# Patient Record
Sex: MALE | Race: BLACK OR AFRICAN AMERICAN | Employment: FULL TIME | URBAN - METROPOLITAN AREA
[De-identification: names, ages, dates, MRNs, and addresses within clinical notes are randomized per-mention and may not be internally consistent; named-entity substitution may affect disease eponyms.]

---

## 2023-03-22 ENCOUNTER — APPOINTMENT (OUTPATIENT)
Dept: GENERAL RADIOLOGY | Age: 48
DRG: 872 | End: 2023-03-22
Payer: COMMERCIAL

## 2023-03-22 ENCOUNTER — HOSPITAL ENCOUNTER (INPATIENT)
Age: 48
LOS: 3 days | Discharge: HOME | DRG: 872 | End: 2023-03-25
Attending: EMERGENCY MEDICINE | Admitting: INTERNAL MEDICINE
Payer: COMMERCIAL

## 2023-03-22 ENCOUNTER — APPOINTMENT (OUTPATIENT)
Dept: CT IMAGING | Age: 48
DRG: 872 | End: 2023-03-22
Payer: COMMERCIAL

## 2023-03-22 ENCOUNTER — APPOINTMENT (OUTPATIENT)
Dept: ULTRASOUND IMAGING | Age: 48
DRG: 872 | End: 2023-03-22
Payer: COMMERCIAL

## 2023-03-22 DIAGNOSIS — R65.20 SEVERE SEPSIS (HCC): Primary | ICD-10-CM

## 2023-03-22 DIAGNOSIS — N17.9 AKI (ACUTE KIDNEY INJURY) (HCC): ICD-10-CM

## 2023-03-22 DIAGNOSIS — N30.00 ACUTE CYSTITIS WITHOUT HEMATURIA: ICD-10-CM

## 2023-03-22 DIAGNOSIS — R79.89 ELEVATED LFTS: ICD-10-CM

## 2023-03-22 DIAGNOSIS — A41.9 SEVERE SEPSIS (HCC): Primary | ICD-10-CM

## 2023-03-22 DIAGNOSIS — R74.8 ELEVATED CK: ICD-10-CM

## 2023-03-22 LAB
ALBUMIN SERPL-MCNC: 3.4 G/DL (ref 3.4–5)
ALP SERPL-CCNC: 118 U/L (ref 40–129)
ALT SERPL-CCNC: 167 U/L (ref 10–40)
ANION GAP SERPL CALCULATED.3IONS-SCNC: 17 MMOL/L (ref 3–16)
APAP SERPL-MCNC: <5 UG/ML (ref 10–30)
AST SERPL-CCNC: 140 U/L (ref 15–37)
BACTERIA URNS QL MICRO: ABNORMAL /HPF
BASE EXCESS BLDV CALC-SCNC: 1.3 MMOL/L
BASOPHILS # BLD: 0 K/UL (ref 0–0.2)
BASOPHILS NFR BLD: 0 %
BILIRUB DIRECT SERPL-MCNC: <0.2 MG/DL (ref 0–0.3)
BILIRUB INDIRECT SERPL-MCNC: ABNORMAL MG/DL (ref 0–1)
BILIRUB SERPL-MCNC: 0.9 MG/DL (ref 0–1)
BILIRUB UR QL STRIP.AUTO: ABNORMAL
BUN SERPL-MCNC: 33 MG/DL (ref 7–20)
C DIFF TOX A+B STL QL IA: NORMAL
CALCIUM SERPL-MCNC: 8.7 MG/DL (ref 8.3–10.6)
CHLORIDE SERPL-SCNC: 99 MMOL/L (ref 99–110)
CHP ED QC CHECK: YES
CK SERPL-CCNC: 1996 U/L (ref 39–308)
CLARITY UR: ABNORMAL
CO2 BLDV-SCNC: 26 MMOL/L
CO2 SERPL-SCNC: 20 MMOL/L (ref 21–32)
COHGB MFR BLDV: 1.2 %
COLOR UR: ABNORMAL
CREAT SERPL-MCNC: 1.8 MG/DL (ref 0.9–1.3)
CRP SERPL-MCNC: 388.1 MG/L (ref 0–5.1)
DEPRECATED RDW RBC AUTO: 13.6 % (ref 12.4–15.4)
EKG ATRIAL RATE: 132 BPM
EKG DIAGNOSIS: NORMAL
EKG P AXIS: 60 DEGREES
EKG P-R INTERVAL: 134 MS
EKG Q-T INTERVAL: 298 MS
EKG QRS DURATION: 78 MS
EKG QTC CALCULATION (BAZETT): 441 MS
EKG R AXIS: 75 DEGREES
EKG T AXIS: 4 DEGREES
EKG VENTRICULAR RATE: 132 BPM
EOSINOPHIL # BLD: 0 K/UL (ref 0–0.6)
EOSINOPHIL NFR BLD: 0 %
EPI CELLS #/AREA URNS AUTO: 7 /HPF (ref 0–5)
EPITHELIALS (RENAL), 013149: PRESENT
ETHANOLAMINE SERPL-MCNC: NORMAL MG/DL (ref 0–0.08)
FLUAV RNA UPPER RESP QL NAA+PROBE: NEGATIVE
FLUBV AG NPH QL: NEGATIVE
GFR SERPLBLD CREATININE-BSD FMLA CKD-EPI: 46 ML/MIN/{1.73_M2}
GLUCOSE BLD-MCNC: 150 MG/DL
GLUCOSE BLD-MCNC: 150 MG/DL (ref 70–99)
GLUCOSE SERPL-MCNC: 192 MG/DL (ref 70–99)
GLUCOSE UR STRIP.AUTO-MCNC: 100 MG/DL
GRANULAR CASTS: PRESENT
HAV IGM SERPL QL IA: NORMAL
HBV CORE IGM SERPL QL IA: NORMAL
HBV SURFACE AG SERPL QL IA: NORMAL
HCO3 BLDV-SCNC: 25 MMOL/L (ref 23–29)
HCT VFR BLD AUTO: 37.7 % (ref 40.5–52.5)
HCV AB SERPL QL IA: NORMAL
HGB BLD-MCNC: 12.6 G/DL (ref 13.5–17.5)
HGB UR QL STRIP.AUTO: ABNORMAL
HYALINE CASTS #/AREA URNS AUTO: 35 /LPF (ref 0–8)
KETONES UR STRIP.AUTO-MCNC: ABNORMAL MG/DL
LACTATE BLDV-SCNC: 2.6 MMOL/L (ref 0.4–2)
LACTATE BLDV-SCNC: 3.4 MMOL/L (ref 0.4–2)
LEUKOCYTE ESTERASE UR QL STRIP.AUTO: ABNORMAL
LIPASE SERPL-CCNC: 59 U/L (ref 13–60)
LYMPHOCYTES # BLD: 0.4 K/UL (ref 1–5.1)
LYMPHOCYTES NFR BLD: 2 %
MCH RBC QN AUTO: 27.5 PG (ref 26–34)
MCHC RBC AUTO-ENTMCNC: 33.5 G/DL (ref 31–36)
MCV RBC AUTO: 82.1 FL (ref 80–100)
METAMYELOCYTES NFR BLD MANUAL: 1 %
METHGB MFR BLDV: 0.6 %
MONOCYTES # BLD: 0.4 K/UL (ref 0–1.3)
MONOCYTES NFR BLD: 2 %
MUCUS: PRESENT
NEUTROPHILS # BLD: 19.8 K/UL (ref 1.7–7.7)
NEUTROPHILS NFR BLD: 66 %
NEUTS BAND NFR BLD MANUAL: 29 % (ref 0–7)
NEUTS VAC BLD QL SMEAR: PRESENT
NITRITE UR QL STRIP.AUTO: POSITIVE
O2 THERAPY: ABNORMAL
PCO2 BLDV: 37 MMHG (ref 40–50)
PERFORMED ON: ABNORMAL
PH BLDV: 7.44 [PH] (ref 7.35–7.45)
PH UR STRIP.AUTO: 5 [PH] (ref 5–8)
PLATELET # BLD AUTO: 205 K/UL (ref 135–450)
PLATELET BLD QL SMEAR: ADEQUATE
PMV BLD AUTO: 7.8 FL (ref 5–10.5)
PO2 BLDV: <30 MMHG
POTASSIUM SERPL-SCNC: 4.5 MMOL/L (ref 3.5–5.1)
PROCALCITONIN SERPL IA-MCNC: 99.65 NG/ML (ref 0–0.15)
PROT SERPL-MCNC: 8.4 G/DL (ref 6.4–8.2)
PROT UR STRIP.AUTO-MCNC: 300 MG/DL
RBC # BLD AUTO: 4.59 M/UL (ref 4.2–5.9)
RBC CLUMPS #/AREA URNS AUTO: 14 /HPF (ref 0–4)
S PYO AG THROAT QL: NEGATIVE
SAO2 % BLDV: 35 %
SARS-COV-2 RDRP RESP QL NAA+PROBE: NOT DETECTED
SLIDE REVIEW: ABNORMAL
SODIUM SERPL-SCNC: 136 MMOL/L (ref 136–145)
SP GR UR STRIP.AUTO: 1.03 (ref 1–1.03)
UA COMPLETE W REFLEX CULTURE PNL UR: ABNORMAL
UA DIPSTICK W REFLEX MICRO PNL UR: YES
URN SPEC COLLECT METH UR: ABNORMAL
UROBILINOGEN UR STRIP-ACNC: 1 E.U./DL
WBC # BLD AUTO: 20.6 K/UL (ref 4–11)
WBC #/AREA URNS AUTO: 7 /HPF (ref 0–5)

## 2023-03-22 PROCEDURE — 93005 ELECTROCARDIOGRAM TRACING: CPT | Performed by: EMERGENCY MEDICINE

## 2023-03-22 PROCEDURE — 2500000003 HC RX 250 WO HCPCS: Performed by: INTERNAL MEDICINE

## 2023-03-22 PROCEDURE — 80143 DRUG ASSAY ACETAMINOPHEN: CPT

## 2023-03-22 PROCEDURE — 2580000003 HC RX 258: Performed by: INTERNAL MEDICINE

## 2023-03-22 PROCEDURE — 80048 BASIC METABOLIC PNL TOTAL CA: CPT

## 2023-03-22 PROCEDURE — 96365 THER/PROPH/DIAG IV INF INIT: CPT

## 2023-03-22 PROCEDURE — 71046 X-RAY EXAM CHEST 2 VIEWS: CPT

## 2023-03-22 PROCEDURE — 87150 DNA/RNA AMPLIFIED PROBE: CPT

## 2023-03-22 PROCEDURE — 93010 ELECTROCARDIOGRAM REPORT: CPT | Performed by: INTERNAL MEDICINE

## 2023-03-22 PROCEDURE — 87077 CULTURE AEROBIC IDENTIFY: CPT

## 2023-03-22 PROCEDURE — 94761 N-INVAS EAR/PLS OXIMETRY MLT: CPT

## 2023-03-22 PROCEDURE — 99285 EMERGENCY DEPT VISIT HI MDM: CPT

## 2023-03-22 PROCEDURE — 87324 CLOSTRIDIUM AG IA: CPT

## 2023-03-22 PROCEDURE — 6360000002 HC RX W HCPCS: Performed by: INTERNAL MEDICINE

## 2023-03-22 PROCEDURE — 2580000003 HC RX 258: Performed by: EMERGENCY MEDICINE

## 2023-03-22 PROCEDURE — 84145 PROCALCITONIN (PCT): CPT

## 2023-03-22 PROCEDURE — 2060000000 HC ICU INTERMEDIATE R&B

## 2023-03-22 PROCEDURE — 6360000002 HC RX W HCPCS: Performed by: EMERGENCY MEDICINE

## 2023-03-22 PROCEDURE — 80074 ACUTE HEPATITIS PANEL: CPT

## 2023-03-22 PROCEDURE — 96361 HYDRATE IV INFUSION ADD-ON: CPT

## 2023-03-22 PROCEDURE — 82077 ASSAY SPEC XCP UR&BREATH IA: CPT

## 2023-03-22 PROCEDURE — 85025 COMPLETE CBC W/AUTO DIFF WBC: CPT

## 2023-03-22 PROCEDURE — 96375 TX/PRO/DX INJ NEW DRUG ADDON: CPT

## 2023-03-22 PROCEDURE — 83605 ASSAY OF LACTIC ACID: CPT

## 2023-03-22 PROCEDURE — 96366 THER/PROPH/DIAG IV INF ADDON: CPT

## 2023-03-22 PROCEDURE — 82550 ASSAY OF CK (CPK): CPT

## 2023-03-22 PROCEDURE — 87040 BLOOD CULTURE FOR BACTERIA: CPT

## 2023-03-22 PROCEDURE — 74176 CT ABD & PELVIS W/O CONTRAST: CPT

## 2023-03-22 PROCEDURE — 87635 SARS-COV-2 COVID-19 AMP PRB: CPT

## 2023-03-22 PROCEDURE — 76705 ECHO EXAM OF ABDOMEN: CPT

## 2023-03-22 PROCEDURE — 81001 URINALYSIS AUTO W/SCOPE: CPT

## 2023-03-22 PROCEDURE — 96368 THER/DIAG CONCURRENT INF: CPT

## 2023-03-22 PROCEDURE — 2500000003 HC RX 250 WO HCPCS: Performed by: EMERGENCY MEDICINE

## 2023-03-22 PROCEDURE — 36415 COLL VENOUS BLD VENIPUNCTURE: CPT

## 2023-03-22 PROCEDURE — 87081 CULTURE SCREEN ONLY: CPT

## 2023-03-22 PROCEDURE — 87880 STREP A ASSAY W/OPTIC: CPT

## 2023-03-22 PROCEDURE — 87804 INFLUENZA ASSAY W/OPTIC: CPT

## 2023-03-22 PROCEDURE — 2580000003 HC RX 258: Performed by: PHYSICIAN ASSISTANT

## 2023-03-22 PROCEDURE — 83690 ASSAY OF LIPASE: CPT

## 2023-03-22 PROCEDURE — 6360000002 HC RX W HCPCS: Performed by: PHYSICIAN ASSISTANT

## 2023-03-22 PROCEDURE — 87449 NOS EACH ORGANISM AG IA: CPT

## 2023-03-22 PROCEDURE — 82803 BLOOD GASES ANY COMBINATION: CPT

## 2023-03-22 PROCEDURE — 80076 HEPATIC FUNCTION PANEL: CPT

## 2023-03-22 PROCEDURE — 86140 C-REACTIVE PROTEIN: CPT

## 2023-03-22 RX ORDER — 0.9 % SODIUM CHLORIDE 0.9 %
1000 INTRAVENOUS SOLUTION INTRAVENOUS ONCE
Status: COMPLETED | OUTPATIENT
Start: 2023-03-22 | End: 2023-03-22

## 2023-03-22 RX ORDER — ACETAMINOPHEN 650 MG/1
650 SUPPOSITORY RECTAL EVERY 6 HOURS PRN
Status: DISCONTINUED | OUTPATIENT
Start: 2023-03-22 | End: 2023-03-25 | Stop reason: HOSPADM

## 2023-03-22 RX ORDER — METRONIDAZOLE 500 MG/100ML
500 INJECTION, SOLUTION INTRAVENOUS EVERY 8 HOURS
Status: DISCONTINUED | OUTPATIENT
Start: 2023-03-22 | End: 2023-03-25 | Stop reason: HOSPADM

## 2023-03-22 RX ORDER — SODIUM CHLORIDE 9 MG/ML
INJECTION, SOLUTION INTRAVENOUS PRN
Status: DISCONTINUED | OUTPATIENT
Start: 2023-03-22 | End: 2023-03-25 | Stop reason: HOSPADM

## 2023-03-22 RX ORDER — POTASSIUM CHLORIDE 20 MEQ/1
40 TABLET, EXTENDED RELEASE ORAL PRN
Status: DISCONTINUED | OUTPATIENT
Start: 2023-03-22 | End: 2023-03-25 | Stop reason: HOSPADM

## 2023-03-22 RX ORDER — ONDANSETRON 2 MG/ML
4 INJECTION INTRAMUSCULAR; INTRAVENOUS EVERY 6 HOURS PRN
Status: DISCONTINUED | OUTPATIENT
Start: 2023-03-22 | End: 2023-03-25 | Stop reason: HOSPADM

## 2023-03-22 RX ORDER — SODIUM CHLORIDE 9 MG/ML
INJECTION, SOLUTION INTRAVENOUS CONTINUOUS
Status: DISCONTINUED | OUTPATIENT
Start: 2023-03-22 | End: 2023-03-23

## 2023-03-22 RX ORDER — PROMETHAZINE HYDROCHLORIDE 25 MG/1
12.5 TABLET ORAL EVERY 6 HOURS PRN
Status: DISCONTINUED | OUTPATIENT
Start: 2023-03-22 | End: 2023-03-25 | Stop reason: HOSPADM

## 2023-03-22 RX ORDER — MAGNESIUM SULFATE IN WATER 40 MG/ML
2000 INJECTION, SOLUTION INTRAVENOUS PRN
Status: DISCONTINUED | OUTPATIENT
Start: 2023-03-22 | End: 2023-03-25 | Stop reason: HOSPADM

## 2023-03-22 RX ORDER — ACETAMINOPHEN 325 MG/1
650 TABLET ORAL EVERY 6 HOURS PRN
COMMUNITY

## 2023-03-22 RX ORDER — SODIUM CHLORIDE 0.9 % (FLUSH) 0.9 %
10 SYRINGE (ML) INJECTION PRN
Status: DISCONTINUED | OUTPATIENT
Start: 2023-03-22 | End: 2023-03-25 | Stop reason: HOSPADM

## 2023-03-22 RX ORDER — ACETAMINOPHEN 325 MG/1
650 TABLET ORAL EVERY 6 HOURS PRN
Status: DISCONTINUED | OUTPATIENT
Start: 2023-03-22 | End: 2023-03-25 | Stop reason: HOSPADM

## 2023-03-22 RX ORDER — POTASSIUM CHLORIDE 7.45 MG/ML
10 INJECTION INTRAVENOUS PRN
Status: DISCONTINUED | OUTPATIENT
Start: 2023-03-22 | End: 2023-03-22

## 2023-03-22 RX ORDER — SODIUM CHLORIDE 0.9 % (FLUSH) 0.9 %
10 SYRINGE (ML) INJECTION EVERY 12 HOURS SCHEDULED
Status: DISCONTINUED | OUTPATIENT
Start: 2023-03-22 | End: 2023-03-25 | Stop reason: HOSPADM

## 2023-03-22 RX ORDER — POTASSIUM CHLORIDE 7.45 MG/ML
10 INJECTION INTRAVENOUS PRN
Status: DISCONTINUED | OUTPATIENT
Start: 2023-03-22 | End: 2023-03-25 | Stop reason: HOSPADM

## 2023-03-22 RX ORDER — ENOXAPARIN SODIUM 100 MG/ML
40 INJECTION SUBCUTANEOUS DAILY
Status: DISCONTINUED | OUTPATIENT
Start: 2023-03-23 | End: 2023-03-25 | Stop reason: HOSPADM

## 2023-03-22 RX ORDER — ONDANSETRON 2 MG/ML
4 INJECTION INTRAMUSCULAR; INTRAVENOUS ONCE
Status: COMPLETED | OUTPATIENT
Start: 2023-03-22 | End: 2023-03-22

## 2023-03-22 RX ADMIN — CEFTRIAXONE SODIUM 2000 MG: 2 INJECTION, POWDER, FOR SOLUTION INTRAMUSCULAR; INTRAVENOUS at 14:34

## 2023-03-22 RX ADMIN — SODIUM CHLORIDE 1000 ML: 9 INJECTION, SOLUTION INTRAVENOUS at 13:36

## 2023-03-22 RX ADMIN — METRONIDAZOLE 500 MG: 500 INJECTION, SOLUTION INTRAVENOUS at 22:58

## 2023-03-22 RX ADMIN — ONDANSETRON 4 MG: 2 INJECTION INTRAMUSCULAR; INTRAVENOUS at 13:54

## 2023-03-22 RX ADMIN — METRONIDAZOLE 1000 MG: 500 INJECTION, SOLUTION INTRAVENOUS at 14:39

## 2023-03-22 RX ADMIN — SODIUM CHLORIDE 1000 ML: 9 INJECTION, SOLUTION INTRAVENOUS at 17:47

## 2023-03-22 RX ADMIN — Medication 10 ML: at 21:40

## 2023-03-22 RX ADMIN — SODIUM CHLORIDE 1000 ML: 9 INJECTION, SOLUTION INTRAVENOUS at 13:42

## 2023-03-22 RX ADMIN — SODIUM CHLORIDE: 9 INJECTION, SOLUTION INTRAVENOUS at 21:33

## 2023-03-22 RX ADMIN — CEFEPIME 2000 MG: 2 INJECTION, POWDER, FOR SOLUTION INTRAVENOUS at 21:39

## 2023-03-22 ASSESSMENT — LIFESTYLE VARIABLES
HOW OFTEN DO YOU HAVE A DRINK CONTAINING ALCOHOL: NEVER
HOW OFTEN DO YOU HAVE A DRINK CONTAINING ALCOHOL: 2-4 TIMES A MONTH
HOW OFTEN DO YOU HAVE A DRINK CONTAINING ALCOHOL: 2-4 TIMES A MONTH
HOW MANY STANDARD DRINKS CONTAINING ALCOHOL DO YOU HAVE ON A TYPICAL DAY: 1 OR 2
HOW MANY STANDARD DRINKS CONTAINING ALCOHOL DO YOU HAVE ON A TYPICAL DAY: 1 OR 2

## 2023-03-22 ASSESSMENT — PAIN - FUNCTIONAL ASSESSMENT: PAIN_FUNCTIONAL_ASSESSMENT: NONE - DENIES PAIN

## 2023-03-22 NOTE — PROGRESS NOTES
Medication Reconciliation    List of medications patient is currently taking is complete. Source of information: 1. Conversation with patient at bedside                                      2. EPIC records      Allergies  Patient has no known allergies. Notes regarding home medications:   1. Patient states that he has been taking Tylenol recently as well as a few doses of family member's methocarbamol 500mg tablets.   2. Patient reports no regular prescription/OTC/herbal medications      [facility] O[facility]   3/22/2023  4:46 PM

## 2023-03-22 NOTE — LETTER
[facility]  [address] [address]  Phone: [phone]             March 25, 2023    Patient: Humberto Bro   YOB: 1975   Date of Visit: 3/22/2023       To Whom It May Concern:    Humberto Bro was seen and treated in our facility  beginning 3/22/2023 until 3/25/2023. He may return to work on Friday March 31, 2023.       Sincerely,       Scott Harrell RN         Signature:__________________________________

## 2023-03-22 NOTE — ED PROVIDER NOTES
time range)   0.9 % sodium chloride infusion (has no administration in time range)   cefepime (MAXIPIME) 2,000 mg in sodium chloride 0.9 % 50 mL IVPB (mini-bag) (has no administration in time range)   cefepime (MAXIPIME) 2,000 mg in sodium chloride 0.9 % 50 mL IVPB (mini-bag) (has no administration in time range)   vancomycin (VANCOCIN) 1,500 mg in sodium chloride 0.9 % 250 mL IVPB (ADDAVIAL) (has no administration in time range)   vancomycin (VANCOCIN) intermittent dosing (placeholder) (has no administration in time range)   0.9 % sodium chloride bolus (0 mLs IntraVENous Stopped 3/22/23 1542)   0.9 % sodium chloride bolus (0 mLs IntraVENous Stopped 3/22/23 1541)   ondansetron (ZOFRAN) injection 4 mg (4 mg IntraVENous Given 3/22/23 1354)   cefTRIAXone (ROCEPHIN) 2,000 mg in sodium chloride 0.9 % 50 mL IVPB (mini-bag) (0 mg IntraVENous Stopped 3/22/23 1542)   metroNIDAZOLE (FLAGYL) 1,000 mg IVPB (0 mg IntraVENous Stopped 3/22/23 1658)   0.9 % sodium chloride bolus (0 mLs IntraVENous Stopped 3/22/23 1955)             Patient was nontoxic, ill appearing, with normal vital signs with exception tachycardia with a rate of 128. Saturating well on room air. Presents for nausea, vomiting, thirst for the past 3-4 days. On exam, he is very dry appearing. Lips and tongue extremely dry. Will check labs and chest x-ray. 2 L of IV fluid ordered. Differential diagnosis includes viral illness, TAMANNA, pneumonia, UTI, sepsis, other    Labs showed negative flu, COVID, strep. Has leukocytosis of 20.6. Lactic acid 3.4. Calcitonin is 99. meet sepsis criteria. Blood cultures obtained. Given Rocephin and Flagyl. Metabolic panel shows glucose 192, anion gap 17, bicarb 20. VBG shows normal venous pH. BUN 33, creatinine 1.8. Has an TAMANNA. ,  and normal alk phos. given the elevated liver enzymes right upper quadrant ultrasound was obtained and was negative. Hepatitis negative. CK 2000.   Tylenol ethanol signed)           Rylee Hernandez, [address]  03/22/23 3845

## 2023-03-22 NOTE — ED TRIAGE NOTES
Pt presents to ED from urgent care, pt states has been ill for approximately 3-4 weeks. Pt is ill appearing, dry mouth, pale, tachycardia noted. Pt alert and oriented and able to follow commands. Denies pain at this time. States urgent care tested him for strep throat, came back positive. Denies any sore throat.  Pt states vomited 4 times yesterday, no appetite, able to drink, but states pop hurts going down

## 2023-03-22 NOTE — ED PROVIDER NOTES
12 lead EKG:  Sinus Tachycardia 132  Axis is   Normal  QTc is  normal  There is no specific ST-T wave changes appreciated. There is no clear evidence of acute ischemia or infarction. It was compared against an EKG from none.        Jocelyn Altamirano MD  03/22/23 7466

## 2023-03-22 NOTE — ED NOTES
Pt. to be admitted to [facility] [address] room 5108. Report called to Dorinda Rasmussen RN and transport to be initiated.       Hazel Phillips RN  03/22/23 2743

## 2023-03-23 LAB
BASOPHILS # BLD: 0 K/UL (ref 0–0.2)
BASOPHILS NFR BLD: 0 %
CK SERPL-CCNC: 1813 U/L (ref 39–308)
CREAT SERPL-MCNC: 1.3 MG/DL (ref 0.9–1.3)
DEPRECATED RDW RBC AUTO: 13.8 % (ref 12.4–15.4)
DOHLE BOD BLD QL SMEAR: PRESENT
EOSINOPHIL # BLD: 0 K/UL (ref 0–0.6)
EOSINOPHIL NFR BLD: 0 %
GFR SERPLBLD CREATININE-BSD FMLA CKD-EPI: >60 ML/MIN/{1.73_M2}
HCT VFR BLD AUTO: 34.2 % (ref 40.5–52.5)
HGB BLD-MCNC: 11.5 G/DL (ref 13.5–17.5)
LYMPHOCYTES # BLD: 1.1 K/UL (ref 1–5.1)
LYMPHOCYTES NFR BLD: 5 %
MCH RBC QN AUTO: 27.7 PG (ref 26–34)
MCHC RBC AUTO-ENTMCNC: 33.5 G/DL (ref 31–36)
MCV RBC AUTO: 82.7 FL (ref 80–100)
MONOCYTES # BLD: 0.2 K/UL (ref 0–1.3)
MONOCYTES NFR BLD: 1 %
NEUTROPHILS # BLD: 19.7 K/UL (ref 1.7–7.7)
NEUTROPHILS NFR BLD: 71 %
NEUTS BAND NFR BLD MANUAL: 23 % (ref 0–7)
NEUTS VAC BLD QL SMEAR: PRESENT
PLATELET # BLD AUTO: 185 K/UL (ref 135–450)
PLATELET BLD QL SMEAR: ADEQUATE
PMV BLD AUTO: 7.8 FL (ref 5–10.5)
RBC # BLD AUTO: 4.13 M/UL (ref 4.2–5.9)
REPORT: NORMAL
SLIDE REVIEW: ABNORMAL
VANCOMYCIN SERPL-MCNC: 17.3 UG/ML
WBC # BLD AUTO: 21 K/UL (ref 4–11)

## 2023-03-23 PROCEDURE — 80202 ASSAY OF VANCOMYCIN: CPT

## 2023-03-23 PROCEDURE — 97161 PT EVAL LOW COMPLEX 20 MIN: CPT | Performed by: PHYSICAL THERAPIST

## 2023-03-23 PROCEDURE — 85025 COMPLETE CBC W/AUTO DIFF WBC: CPT

## 2023-03-23 PROCEDURE — 94760 N-INVAS EAR/PLS OXIMETRY 1: CPT

## 2023-03-23 PROCEDURE — 2500000003 HC RX 250 WO HCPCS: Performed by: INTERNAL MEDICINE

## 2023-03-23 PROCEDURE — 6360000002 HC RX W HCPCS: Performed by: INTERNAL MEDICINE

## 2023-03-23 PROCEDURE — 2580000003 HC RX 258: Performed by: INTERNAL MEDICINE

## 2023-03-23 PROCEDURE — 97530 THERAPEUTIC ACTIVITIES: CPT | Performed by: PHYSICAL THERAPIST

## 2023-03-23 PROCEDURE — 97116 GAIT TRAINING THERAPY: CPT | Performed by: PHYSICAL THERAPIST

## 2023-03-23 PROCEDURE — 82565 ASSAY OF CREATININE: CPT

## 2023-03-23 PROCEDURE — 2060000000 HC ICU INTERMEDIATE R&B

## 2023-03-23 PROCEDURE — 36415 COLL VENOUS BLD VENIPUNCTURE: CPT

## 2023-03-23 PROCEDURE — 82550 ASSAY OF CK (CPK): CPT

## 2023-03-23 PROCEDURE — 87040 BLOOD CULTURE FOR BACTERIA: CPT

## 2023-03-23 RX ORDER — LOPERAMIDE HYDROCHLORIDE 2 MG/1
2 CAPSULE ORAL 4 TIMES DAILY PRN
Status: DISCONTINUED | OUTPATIENT
Start: 2023-03-23 | End: 2023-03-25 | Stop reason: HOSPADM

## 2023-03-23 RX ORDER — SODIUM CHLORIDE, SODIUM LACTATE, POTASSIUM CHLORIDE, CALCIUM CHLORIDE 600; 310; 30; 20 MG/100ML; MG/100ML; MG/100ML; MG/100ML
INJECTION, SOLUTION INTRAVENOUS CONTINUOUS
Status: DISCONTINUED | OUTPATIENT
Start: 2023-03-23 | End: 2023-03-24

## 2023-03-23 RX ADMIN — ENOXAPARIN SODIUM 40 MG: 100 INJECTION SUBCUTANEOUS at 09:27

## 2023-03-23 RX ADMIN — CEFEPIME 2000 MG: 2 INJECTION, POWDER, FOR SOLUTION INTRAVENOUS at 09:34

## 2023-03-23 RX ADMIN — VANCOMYCIN HYDROCHLORIDE 1500 MG: 1.5 INJECTION, POWDER, LYOPHILIZED, FOR SOLUTION INTRAVENOUS at 15:02

## 2023-03-23 RX ADMIN — Medication 10 ML: at 20:17

## 2023-03-23 RX ADMIN — METRONIDAZOLE 500 MG: 500 INJECTION, SOLUTION INTRAVENOUS at 20:19

## 2023-03-23 RX ADMIN — VANCOMYCIN HYDROCHLORIDE 1500 MG: 1.5 INJECTION, POWDER, LYOPHILIZED, FOR SOLUTION INTRAVENOUS at 00:45

## 2023-03-23 RX ADMIN — SODIUM CHLORIDE, POTASSIUM CHLORIDE, SODIUM LACTATE AND CALCIUM CHLORIDE: 600; 310; 30; 20 INJECTION, SOLUTION INTRAVENOUS at 14:58

## 2023-03-23 RX ADMIN — METRONIDAZOLE 500 MG: 500 INJECTION, SOLUTION INTRAVENOUS at 07:10

## 2023-03-23 RX ADMIN — SODIUM CHLORIDE, POTASSIUM CHLORIDE, SODIUM LACTATE AND CALCIUM CHLORIDE: 600; 310; 30; 20 INJECTION, SOLUTION INTRAVENOUS at 10:05

## 2023-03-23 RX ADMIN — SODIUM CHLORIDE: 9 INJECTION, SOLUTION INTRAVENOUS at 01:12

## 2023-03-23 RX ADMIN — METRONIDAZOLE 500 MG: 500 INJECTION, SOLUTION INTRAVENOUS at 13:54

## 2023-03-23 RX ADMIN — CEFEPIME 2000 MG: 2 INJECTION, POWDER, FOR SOLUTION INTRAVENOUS at 20:21

## 2023-03-23 ASSESSMENT — PAIN SCALES - GENERAL: PAINLEVEL_OUTOF10: 0

## 2023-03-23 NOTE — H&P
Hospital Medicine History & Physical      PCP: [facility]    Date of Admission: 3/22/2023    Chief Complaint:    Chief Complaint   Patient presents with    Dehydration     Pt states been sick for approx 3-4 weeks. Pt is ill appearing. States mouth dry. Pain when drinking pop. +emesis. Pt states no appetite          History Of Present Illness:    Patient is a 43-year-old male who presents to the hospital due to diarrhea. According to patient he had 2 bowel movements, those were watery. Patient also has abdominal discomfort, he has not been able to hold down food, he mentions it happened suddenly, he feels weak. He denies chest pain shortness of breath fevers chills. Past Medical History:      History reviewed. No pertinent past medical history. Past Surgical History:      History reviewed. No pertinent surgical history. Medications Prior to Admission:      Prior to Admission medications    Medication Sig Start Date End Date Taking? Authorizing Provider   acetaminophen (TYLENOL) 325 MG tablet Take 650 mg by mouth every 6 hours as needed for Pain or Fever   Yes Historical Provider, MD       Allergies:  Patient has no known allergies. Social History:      TOBACCO:   reports that he has never smoked. He does not have any smokeless tobacco history on file. ETOH:   reports current alcohol use. Family History:       Reviewed in detail and non contributory      History reviewed. No pertinent family history. REVIEW OF SYSTEMS:   Pertinent positives as noted in the HPI. All other systems reviewed and negative. PHYSICAL EXAM PERFORMED:    /61   Pulse 100   Temp 99.3 °F (37.4 °C) (Oral)   Resp 16   Ht 5' 8\" (1.727 m)   Wt 198 lb 6.6 oz (90 kg)   SpO2 100%   BMI 30.17 kg/m²     General appearance:  No apparent distress, cooperative. HEENT:  Normal cephalic, atraumatic without obvious deformity. Conjunctivae/corneas clear. Neck: Supple, with full range of motion.  No Active Hospital Problems    Diagnosis Date Noted    Sepsis [facility]) [A41.9] 03/22/2023       Patient is a 50-year-old male who presents to the hospital due to diarrhea. According to patient he had 2 bowel movements, those were watery. Patient also has abdominal discomfort, he has not been able to hold down food, he mentions it happened suddenly, he feels weak. He denies chest pain shortness of breath fevers chills. Assessment  Dehydration  Diarrhea, rule out C. difficile  Lactic acidosis  Elevated CK, rhabdomyolysis  Leukocytosis, tachycardia, lactic acidosis suspect severe sepsis, unclear etiology  Elevated procalcitonin  Acute kidney injury  Mild pericardial effusion, mild retroperitoneal lymphadenopathy    Plan  Start IV fluids  Start antibiotics with IV vancomycin, cefepime  Check stool C. difficile  Check blood cultures, urine culture  Monitor CK level  Monitor creatinine  Monitor replace electrolytes  DVT prophylaxis-on heparin  Diet: ADULT DIET; Regular  Code Status: Full Code    PT/OT Eval Status: ordered    Dispo - pending clinical improvement       Chel Dykes MD    The note was completed using EMR and Dragon dictation system. Every effort was made to ensure accuracy; however, inadvertent computerized transcription errors may be present. Thank you [facility] for the opportunity to be involved in this patient's care. If you have any questions or concerns please feel free to contact me at [phone].     Chel Dykes MD

## 2023-03-23 NOTE — PROGRESS NOTES
Consult received. Full note to follow    Gertrudis Flores MD  [id number]  [address] [facility]

## 2023-03-23 NOTE — CONSULTS
[address] [place] [address] Nephrology   [facility]  [phone]  Nephrology Consult Note          Patient ID: Ellie Lancaster  Referring/ Physician: Aureliano Peralta MD      HPI/Summary:   Ellie Lancaster is being seen by nephrology for TAMANNA. This is a 58-year-old male with no significant past medical history who presented to the emergency room with diarrhea. Says has been having almost a month of symptoms where has been feeling ill, not eating and drinking well, nausea and vomiting at times but generally just not feeling well. He is [employer] . He does not take any medications at home and has no allergies. He had a CT of his abdomen that showed no renal stones or obstructive uropathy mild pericardial effusion mild retroperitoneal lymphadenopathy and a fat-containing periumbilical hernia. He has not had any fevers or chills, no new rashes no joint pain. No regular NSAID use. No hematuria no frothy urine, no dysuria or trouble voiding. Blood pressure 114/60  Afebrile  Heart rate 89  95% on room air  Urine output 300 cc, no Estrada    Labs reviewed  Sodium 136 potassium 4.5 bicarb 20 BUN 33   creatinine 1.8 > 1.3  lactate 2.6 from 3.4  Calcium 8.7  Procalcitonin over 99  1 blood culture positive for strep pyogenes        Plan:   - his creatinine has trended down with fluids so this is likely a prerenal injury that will improved. He had many hyaline and granular casts on UA. - his UA is consistent with a UTI, cx pending . He does have a positive blood cx for strep pyogens. He is on antibiotics. TAMANNA  Likely due to volume depletion vomiting poor p.o. intake going on for several weeks  CK was elevated 1996  Blood cultures positive  Creatinine 1.8 at time of consult  Urinalysis was turbid trace ketones large blood granular casts hyaline casts mucus 7 WBCs 14 RBCs positive nitrates consistent with a UTI.  300 mg/dL of albumin  CT imaging did not show any hydronephrosis or obstructive stones.   IV weakness  Psychosocial:  insomnia, anxiety, or depression. Additional positive findings: -     PMH/SH/FH:    Medical Hx: reviewed and updated as appropriate  History reviewed. No pertinent past medical history. Surgical Hx: reviewed and updated as appropriate   has no past surgical history on file. Social Hx: reviewed and updated as appropriate  Social History     Tobacco Use    Smoking status: Never    Smokeless tobacco: Not on file   Substance Use Topics    Alcohol use: Yes     Comment: rare        Family hx: reviewed and updated as appropriate  family history is not on file. Medications:   vancomycin, 1,500 mg, Once  sodium chloride flush, 10 mL, 2 times per day  enoxaparin, 40 mg, Daily  cefepime, 2,000 mg, Q12H  vancomycin (VANCOCIN) intermittent dosing (placeholder), , RX Placeholder  metroNIDAZOLE, 500 mg, Q8H       Patient has no known allergies. Allergies:   No Known Allergies      Physical Exam/Objective:   Vitals:    03/23/23 0745   BP: 114/68   Pulse: 89   Resp: 17   Temp: 97.9 °F (36.6 °C)   SpO2: 95%       Intake/Output Summary (Last 24 hours) at 3/23/2023 0920  Last data filed at 3/23/2023 0615  Gross per 24 hour   Intake --   Output 300 ml   Net -300 ml         General appearance:  in no acute distress, comfortable, communicative, awake and alert. HEENT: no icterus, EOM intact, trachea midline. Neck : no masses, appears symmetrical and no JVD appreciated. Respiratory: Respiratory effort normal, bilateral equal chest rise. No wheeze, no crackles   Cardiovascular: Ausculation shows RRR and  no edema   Abdomen: abdomen is soft, non distended, no masses, no pain with palpation. Musculoskeletal:  no joint swelling, no deformity, strength grossly normal.   Skin: no rashes, no induration, no tightening, no jaundice   Neuro:   Follows commands, moves all extremities spontaneously       Data:   CBC:   Recent Labs     03/22/23  1309 03/23/23  0551   WBC 20.6* 21.0*   HGB 12.6* 11.5*   HCT

## 2023-03-23 NOTE — CONSULTS
Clinical Pharmacy Note  Vancomycin Consult    All Felton is a 51 y.o. male ordered Vancomycin for sepsis; consult received from Dr. Analisa Hinojosa to manage therapy. Also receiving cefepime and flagyl. Patient Active Problem List   Diagnosis    Sepsis ([address]       Allergies:  Patient has no known allergies. Temp max:  Temp (24hrs), Av.3 °F (37.4 °C), Min:99.3 °F (37.4 °C), Max:99.3 °F (37.4 °C)      Recent Labs     23  1309   WBC 20.6*       Recent Labs     23  1309   BUN 33*   CREATININE 1.8*       No intake or output data in the 24 hours ending 23 2131    Culture Results:  pending    Ht Readings from Last 1 Encounters:   23 5' 8\" (1.727 m)        Wt Readings from Last 1 Encounters:   23 198 lb 6.6 oz (90 kg)         Estimated Creatinine Clearance: 54 mL/min (A) (based on SCr of 1.8 mg/dL (H)). Assessment/Plan:  Start vancomycin 1500 mg x 1 now then check a random level in the morning  Intermittent based on levels for now with TAMANNA       Thank you for the consult. Will continue to follow.   Digna Seek PharmD  3/22/2023  9:31 PM

## 2023-03-23 NOTE — PROGRESS NOTES
4 Eyes Skin Assessment     NAME:  Zeenat German  YOB: 1975  MEDICAL RECORD NUMBER:  [id number]    The patient is being assessed for  Admission    I agree that One RN has performed a thorough Head to Toe Skin Assessment on the patient. ALL assessment sites listed below have been assessed. Areas assessed by both nurses:    Head, Face, Ears, Shoulders, Back, Chest, Arms, Elbows, Hands, Sacrum. Buttock, Coccyx, Ischium, and Legs. Feet and Heels        Does the Patient have a Wound?  No noted wound(s)       Taco Prevention initiated by RN: No   Wound Care Orders initiated by RN: No    Pressure Injury (Stage 3,4, Unstageable, DTI, NWPT, and Complex wounds) if present, place referral order by RN under : No    New and Established Ostomies, if present place, referral order under : No      Nurse 1 eSignature: Electronically signed by Melita Lopez RN on 3/23/23 at 12:04 AM EDT    **SHARE this note so that the co-signing nurse can place an eSignature**    Nurse 2 eSignature: Electronically signed by Mattie Garcia RN on 3/23/23 at 12:05 AM EDT

## 2023-03-23 NOTE — PROGRESS NOTES
Pt arrived to room 5108 from ED via stretcher. Pt ambulated from stretcher to bed independently. Heart monitor placed, NSR on tele. CMU verified. Pt awake, alert, and oriented x4. VSS. Assessment completed and history obtained. Patient on RA. Pt oriented to room and unit. Orders reviewed, medications verified, all questions answered. Pt resting comfortably in bed, voices no complaints at this time. Bed in lowest position with wheels locked and bed alarm on. Call light within reach. Will continue to monitor.        Electronically signed by Luis Melo RN on 3/22/2023 at 20:21 PM

## 2023-03-23 NOTE — CONSULTS
Clinical Pharmacy Note  Vancomycin Consult    Bala Schilling is a 51 y.o. male ordered Vancomycin for sepsis; consult received from Dr. Chandrakant Grady to manage therapy. Also receiving cefepime and flagyl. Patient Active Problem List   Diagnosis    Sepsis ([address]       Allergies:  Patient has no known allergies. Temp max:  Temp (24hrs), Av.6 °F (37 °C), Min:97.9 °F (36.6 °C), Max:99.3 °F (37.4 °C)      Recent Labs     23  1309 23  0551   WBC 20.6* 21.0*         Recent Labs     23  1309 23  0551   BUN 33*  --    CREATININE 1.8* 1.3           Intake/Output Summary (Last 24 hours) at 3/23/2023 8397  Last data filed at 3/23/2023 0615  Gross per 24 hour   Intake --   Output 300 ml   Net -300 ml       Culture Results:  pending    Ht Readings from Last 1 Encounters:   23 5' 8\" (1.727 m)          Wt Readings from Last 1 Encounters:   23 203 lb 4.2 oz (92.2 kg)         Estimated Creatinine Clearance: 76 mL/min (based on SCr of 1.3 mg/dL). Assessment/Plan:  Vanco random level 5 hours after 1500mg dose given 17.3mg/L  Creatinine improved 1.8 to 1.3 this AM  Continue Intermittent based on levels for now with TAMANNA   Repeat Vancomycin 1500mg today at noon  Random level tomorrow morning    Thank you for the consult. Will continue to follow.   Gabriel Honeycutt, [facility], 3/23/2023 8:10 AM

## 2023-03-23 NOTE — PROGRESS NOTES
Occupational Therapy      OT order received and chart reviewed. Per physical therapy, pt does not warrant OT evaluation. Pt reports he took a shower today and has no concerns with ADLs/mobility. Will sign off at this time.     Electronically signed by Arianna Elliott OT on 3/23/2023 at 1:58 PM

## 2023-03-23 NOTE — PROGRESS NOTES
According to patient he had 2 bowel movements, those were watery. Patient also has abdominal discomfort, he has not been able to hold down food, he mentions it happened suddenly, he feels weak. He denies chest pain shortness of breath fevers chills. \" Negative for step A, influenza, covid and C-diff  Response To Previous Treatment: Not applicable  Family / Caregiver Present: No  Referring Practitioner: Dr Yamila Titus  Referral Date : 03/22/23  Follows Commands: Within Functional Limits  Subjective  Subjective: pt very pleasant and agreeable to working with therapy         Social/Functional History  Social/Functional History  Lives With:  (grandmother)  Type of Home: House  Home Layout: Two level, Able to Live on Main level with bedroom/bathroom  Bathroom Shower/Tub: Walk-in shower  Bathroom Equipment: Grab bars in shower  ADL Assistance: Independent  Homemaking Assistance: Independent  Ambulation Assistance: Independent  Transfer Assistance: Independent  Active : Yes  Mode of Transportation: Car  Type of Occupation:   Vision/Hearing  Vision  Vision: Within Functional Limits  Hearing  Hearing: Within functional limits    Cognition   Orientation  Overall Orientation Status: Within Functional Limits  Cognition  Overall Cognitive Status: WFL     Objective   Heart Rate: 89  Heart Rate Source: Monitor  BP: 114/68  BP Location: Left Arm  BP Method: Automatic  Patient Position: Semi fowlers  MAP (Calculated): 83  Resp: 17  SpO2: 95 %  O2 Device: None (Room air)              AROM RLE (degrees)  RLE AROM: WFL  AROM LLE (degrees)  LLE AROM : WFL  Strength RLE  Strength RLE: WFL  Strength LLE  Strength LLE: WFL           Bed mobility  Supine to Sit: Independent  Sit to Supine: Independent  Transfers  Sit to Stand: Modified independent  Stand to Sit: Modified independent  Ambulation  Surface: Level tile  Device: No Device  Assistance: Stand by assistance  Quality of Gait: slower pace than his normal but no

## 2023-03-23 NOTE — ACP (ADVANCE CARE PLANNING)
Advance Care Planning     Advance Care Planning Inpatient Note  [facility]    Today's Date: 3/23/2023  Unit: [facility]    Received request from [employer]. Upon review of chart and communication with care team, patient's decision making abilities are not in question. . Patient was/were present in the room during visit. Goals of ACP Conversation:  Discuss advance care planning documents    Health Care Decision Makers:     No healthcare decision makers have been documented. Click here to complete [address] including selection of the Healthcare Decision Maker Relationship (ie \"Primary\")  Summary:  No Decision Maker named by patient at this time    Advance Care Planning Documents (Patient Wishes):  None     Assessment:  Pt stated that both his parents are  and his grandmother has dementia.  Pt also said he  wants to name is his significant other as HCPOA agent but wants to go over the docs with her first.     Interventions:  Provided education on documents for clarity and greater understanding  Discussed and provided education on state decision maker hierarchy  Encouraged ongoing ACP conversation with future decision makers and loved ones  Reviewed but did not complete ACP document    Care Preferences Communicated:   No    Outcomes/Plan:  ACP Discussion: Postponed  Pt to follow-up    Electronically signed by Chaplain Jose Carlos on 3/23/2023 at 3:39 PM

## 2023-03-24 LAB
ANION GAP SERPL CALCULATED.3IONS-SCNC: 12 MMOL/L (ref 3–16)
BACTERIA BLD CULT ORG #2: NORMAL
BACTERIA BLD CULT: NORMAL
BASOPHILS # BLD: 0.1 K/UL (ref 0–0.2)
BASOPHILS NFR BLD: 0.5 %
BUN SERPL-MCNC: 19 MG/DL (ref 7–20)
CALCIUM SERPL-MCNC: 7.4 MG/DL (ref 8.3–10.6)
CHLORIDE SERPL-SCNC: 109 MMOL/L (ref 99–110)
CK SERPL-CCNC: 749 U/L (ref 39–308)
CO2 SERPL-SCNC: 16 MMOL/L (ref 21–32)
CREAT SERPL-MCNC: 0.7 MG/DL (ref 0.9–1.3)
DEPRECATED RDW RBC AUTO: 14.2 % (ref 12.4–15.4)
EOSINOPHIL # BLD: 0 K/UL (ref 0–0.6)
EOSINOPHIL NFR BLD: 0.2 %
GFR SERPLBLD CREATININE-BSD FMLA CKD-EPI: >60 ML/MIN/{1.73_M2}
GLUCOSE SERPL-MCNC: 81 MG/DL (ref 70–99)
HCT VFR BLD AUTO: 28.4 % (ref 40.5–52.5)
HGB BLD-MCNC: 9.6 G/DL (ref 13.5–17.5)
LV EF: 58 %
LVEF MODALITY: NORMAL
LYMPHOCYTES # BLD: 1.1 K/UL (ref 1–5.1)
LYMPHOCYTES NFR BLD: 8 %
MCH RBC QN AUTO: 28.2 PG (ref 26–34)
MCHC RBC AUTO-ENTMCNC: 33.8 G/DL (ref 31–36)
MCV RBC AUTO: 83.3 FL (ref 80–100)
MONOCYTES # BLD: 0.7 K/UL (ref 0–1.3)
MONOCYTES NFR BLD: 5.1 %
NEUTROPHILS # BLD: 11.4 K/UL (ref 1.7–7.7)
NEUTROPHILS NFR BLD: 86.2 %
ORGANISM: ABNORMAL
PLATELET # BLD AUTO: 148 K/UL (ref 135–450)
PMV BLD AUTO: 8.2 FL (ref 5–10.5)
POTASSIUM SERPL-SCNC: 4.1 MMOL/L (ref 3.5–5.1)
RBC # BLD AUTO: 3.4 M/UL (ref 4.2–5.9)
S PYO THROAT QL CULT: ABNORMAL
S PYO THROAT QL CULT: ABNORMAL
SODIUM SERPL-SCNC: 137 MMOL/L (ref 136–145)
VANCOMYCIN SERPL-MCNC: 6.6 UG/ML
WBC # BLD AUTO: 13.2 K/UL (ref 4–11)

## 2023-03-24 PROCEDURE — 85025 COMPLETE CBC W/AUTO DIFF WBC: CPT

## 2023-03-24 PROCEDURE — 2060000000 HC ICU INTERMEDIATE R&B

## 2023-03-24 PROCEDURE — 6370000000 HC RX 637 (ALT 250 FOR IP): Performed by: INTERNAL MEDICINE

## 2023-03-24 PROCEDURE — 6360000002 HC RX W HCPCS: Performed by: INTERNAL MEDICINE

## 2023-03-24 PROCEDURE — 2580000003 HC RX 258: Performed by: INTERNAL MEDICINE

## 2023-03-24 PROCEDURE — 93306 TTE W/DOPPLER COMPLETE: CPT

## 2023-03-24 PROCEDURE — 36415 COLL VENOUS BLD VENIPUNCTURE: CPT

## 2023-03-24 PROCEDURE — 80048 BASIC METABOLIC PNL TOTAL CA: CPT

## 2023-03-24 PROCEDURE — 97116 GAIT TRAINING THERAPY: CPT

## 2023-03-24 PROCEDURE — 82550 ASSAY OF CK (CPK): CPT

## 2023-03-24 PROCEDURE — 2500000003 HC RX 250 WO HCPCS: Performed by: INTERNAL MEDICINE

## 2023-03-24 PROCEDURE — 80202 ASSAY OF VANCOMYCIN: CPT

## 2023-03-24 RX ORDER — METRONIDAZOLE 500 MG/1
500 TABLET ORAL 3 TIMES DAILY
Qty: 15 TABLET | Refills: 0 | Status: SHIPPED | OUTPATIENT
Start: 2023-03-24 | End: 2023-03-29

## 2023-03-24 RX ORDER — LOPERAMIDE HYDROCHLORIDE 2 MG/1
2 CAPSULE ORAL 4 TIMES DAILY PRN
Qty: 20 CAPSULE | Refills: 0 | Status: SHIPPED | OUTPATIENT
Start: 2023-03-24 | End: 2023-03-29

## 2023-03-24 RX ORDER — DIAPER,BRIEF,INFANT-TODD,DISP
EACH MISCELLANEOUS 2 TIMES DAILY
Status: DISCONTINUED | OUTPATIENT
Start: 2023-03-24 | End: 2023-03-25 | Stop reason: HOSPADM

## 2023-03-24 RX ORDER — DIAPER,BRIEF,INFANT-TODD,DISP
EACH MISCELLANEOUS
Qty: 30 G | Refills: 1 | Status: SHIPPED | OUTPATIENT
Start: 2023-03-24

## 2023-03-24 RX ORDER — CEFUROXIME AXETIL 500 MG/1
500 TABLET ORAL 2 TIMES DAILY
Qty: 10 TABLET | Refills: 0 | Status: SHIPPED | OUTPATIENT
Start: 2023-03-24 | End: 2023-03-29

## 2023-03-24 RX ORDER — TRISODIUM CITRATE DIHYDRATE AND CITRIC ACID MONOHYDRATE 500; 334 MG/5ML; MG/5ML
30 SOLUTION ORAL ONCE
Status: COMPLETED | OUTPATIENT
Start: 2023-03-24 | End: 2023-03-24

## 2023-03-24 RX ADMIN — HYDROCORTISONE: 0.01 CREAM TOPICAL at 13:56

## 2023-03-24 RX ADMIN — METRONIDAZOLE 500 MG: 500 INJECTION, SOLUTION INTRAVENOUS at 14:00

## 2023-03-24 RX ADMIN — METRONIDAZOLE 500 MG: 500 INJECTION, SOLUTION INTRAVENOUS at 21:09

## 2023-03-24 RX ADMIN — METRONIDAZOLE 500 MG: 500 INJECTION, SOLUTION INTRAVENOUS at 05:49

## 2023-03-24 RX ADMIN — SODIUM CHLORIDE, POTASSIUM CHLORIDE, SODIUM LACTATE AND CALCIUM CHLORIDE: 600; 310; 30; 20 INJECTION, SOLUTION INTRAVENOUS at 08:57

## 2023-03-24 RX ADMIN — SODIUM CITRATE AND CITRIC ACID MONOHYDRATE 30 ML: 500; 334 SOLUTION ORAL at 08:59

## 2023-03-24 RX ADMIN — VANCOMYCIN HYDROCHLORIDE 1500 MG: 1.5 INJECTION, POWDER, LYOPHILIZED, FOR SOLUTION INTRAVENOUS at 10:07

## 2023-03-24 RX ADMIN — CEFEPIME 2000 MG: 2 INJECTION, POWDER, FOR SOLUTION INTRAVENOUS at 08:59

## 2023-03-24 RX ADMIN — HYDROCORTISONE: 0.01 CREAM TOPICAL at 21:09

## 2023-03-24 RX ADMIN — Medication 10 ML: at 21:09

## 2023-03-24 RX ADMIN — VANCOMYCIN HYDROCHLORIDE 1500 MG: 1.5 INJECTION, POWDER, LYOPHILIZED, FOR SOLUTION INTRAVENOUS at 23:05

## 2023-03-24 NOTE — PLAN OF CARE
Problem: Safety - Adult  Goal: Free from fall injury  3/24/2023 1105 by Elvira Corcoran RN  Outcome: Progressing     Problem: Discharge Planning  Goal: Discharge to home or other facility with appropriate resources  Outcome: Progressing     Problem: ABCDS Injury Assessment  Goal: Absence of physical injury  Outcome: Progressing     Problem: Pain  Goal: Verbalizes/displays adequate comfort level or baseline comfort level  3/24/2023 1105 by Elvira Corcoran RN  Outcome: Progressing     Problem: Gastrointestinal - Adult  Goal: Minimal or absence of nausea and vomiting  3/24/2023 1105 by Elvira Corcoran RN  Outcome: Progressing     Problem: Infection - Adult  Goal: Absence of infection at discharge  3/24/2023 1105 by Elvira Corcoran RN  Outcome: Progressing     Problem: Infection - Adult  Goal: Absence of infection during hospitalization  Outcome: Progressing

## 2023-03-24 NOTE — PROGRESS NOTES
Clinical Pharmacy Note  Vancomycin Consult    Bonnie Campos is a 51 y.o. male ordered Vancomycin for sepsis; consult received from Dr. Rachel Garcia to manage therapy. Also receiving cefepime and flagyl. Patient Active Problem List   Diagnosis    Sepsis ([address]       Allergies:  Patient has no known allergies. Temp max:  Temp (24hrs), Av.4 °F (36.9 °C), Min:97.7 °F (36.5 °C), Max:99 °F (37.2 °C)      Recent Labs     23  1309 23  0551 23  0426   WBC 20.6* 21.0* 13.2*         Recent Labs     23  1309 23  0551 23  0426   BUN 33*  --  19   CREATININE 1.8* 1.3 0.7*           Intake/Output Summary (Last 24 hours) at 3/24/2023 1052  Last data filed at 3/23/2023 2247  Gross per 24 hour   Intake 800 ml   Output 750 ml   Net 50 ml         Culture Results:  pending    Ht Readings from Last 1 Encounters:   23 5' 8\" (1.727 m)          Wt Readings from Last 1 Encounters:   23 205 lb 7.5 oz (93.2 kg)         Estimated Creatinine Clearance: 141 mL/min (A) (based on SCr of 0.7 mg/dL (L)). Assessment/Plan:  Vancomycin day 2  Vanco random level 13 hours after 1500mg dose given 6.6 mg/L  Creatinine improved to baseline this AM  Vancomycin 1500mg q12h starting this AM  Random level tomorrow morning to verify clearing as expected. Predicted     Thank you for the consult. Will continue to follow.   Gonzalo Villafana, [facility], 3/24/2023 10:52 AM

## 2023-03-24 NOTE — PLAN OF CARE
Problem: Safety - Adult  Goal: Free from fall injury  Outcome: Progressing  Flowsheets (Taken 3/24/2023 0139)  Free From Fall Injury: Instruct family/caregiver on patient safety     Problem: Pain  Goal: Verbalizes/displays adequate comfort level or baseline comfort level  Outcome: Progressing     Problem: Gastrointestinal - Adult  Goal: Minimal or absence of nausea and vomiting  Outcome: Progressing  Flowsheets (Taken 3/24/2023 0139)  Minimal or absence of nausea and vomiting:   Administer IV fluids as ordered to ensure adequate hydration   Administer ordered antiemetic medications as needed   Advance diet as tolerated, if ordered     Problem: Infection - Adult  Goal: Absence of infection at discharge  Outcome: Progressing  Flowsheets (Taken 3/24/2023 0139)  Absence of infection at discharge:   Assess and monitor for signs and symptoms of infection   Monitor lab/diagnostic results   Administer medications as ordered

## 2023-03-24 NOTE — PROGRESS NOTES
Pt had an IV running with vancomycin. Pt IV infiltrated. Vancomycin was stopped and removed. Pharmacy was called and recommendation for hydrocortisone cream was recommended for treatment. RN notified provider.

## 2023-03-24 NOTE — PROGRESS NOTES
Physical Therapy  Facility/Department: [facility] CARE  Physical Therapy Initial Assessment  Discharge Summary     Name: Olga Hanks  : 1975  MRN: [id number]  Date of Service: 3/24/2023    Discharge Recommendations:  Home independently, No therapy recommended at discharge   PT Equipment Recommendations  Equipment Needed: No    Olga Hanks scored a  on the AM-PAC short mobility form. At this time, no further PT is recommended upon discharge due to independent mobility. Recommend patient returns to prior setting with prior services. This note also serves as a D/C Summary in the event that this patient is discharged prior to the next therapy session. Patient Diagnosis(es): The primary encounter diagnosis was Severe sepsis ([facility] [address] Diagnoses of Acute cystitis without hematuria, TAMANNA (acute kidney injury) ([facility] [address] Elevated LFTs, and Elevated CK were also pertinent to this visit. Past Medical History:  has no past medical history on file. Past Surgical History:  has no past surgical history on file. Assessment   Body Structures, Functions, Activity Limitations Requiring Skilled Therapeutic Intervention: Decreased functional mobility   Assessment: pt is a 50 yo male who was adm to hosp with nause, vomiting and diarrhea; pt at baseline is Ind and is a , so he is very active; Pt currently mod I  for mobility tasks; anticpate pt will be safe to return home without any further therapy however will follow while in hosp to decrease secondary effect of immobility.  Discharge from acute care PT  Specific Instructions for Next Treatment: Discharge from acute care PT  Therapy Prognosis: Good  Decision Making: Low Complexity  Requires PT Follow-Up: No  Activity Tolerance  Activity Tolerance: Patient tolerated treatment well     Plan   Physcial Therapy Plan  General Plan:  (1-2 more visits)  Specific Instructions for Next Treatment: Discharge from acute care PT  Safety Devices  Type of Devices: Left in bed, Call light within reach (positioned EOB, up ad alma rosa)  Restraints  Restraints Initially in Place: No     Restrictions  Position Activity Restriction  Other position/activity restrictions: IV attached     Subjective   General  Chart Reviewed: Yes  Additional Pertinent Hx: per H&P note: \"Patient is a 50-year-old male who presents to the hospital due to diarrhea. According to patient he had 2 bowel movements, those were watery. Patient also has abdominal discomfort, he has not been able to hold down food, he mentions it happened suddenly, he feels weak. He denies chest pain shortness of breath fevers chills. \" Negative for step A, influenza, covid and C-diff  Response To Previous Treatment: Patient with no complaints from previous session. Family / Caregiver Present: No  Referring Practitioner: Dr Larry De  Referral Date : 03/22/23  Follows Commands: Within Functional Limits  Subjective  Subjective: pt very pleasant and agreeable to working with therapy. Pt reports feeling better at this time and eager to return home.          Social/Functional History  Social/Functional History  Lives With:  (grandmother)  Type of Home: House  Home Layout: Two level, Able to Live on Main level with bedroom/bathroom  Bathroom Shower/Tub: Walk-in shower  Bathroom Equipment: Grab bars in shower  ADL Assistance: Independent  Homemaking Assistance: Independent  Ambulation Assistance: Independent  Transfer Assistance: Independent  Active : Yes  Mode of Transportation: Car  Type of Occupation:   Vision/Hearing  Vision  Vision: Within Functional Limits  Hearing  Hearing: Within functional limits    Cognition   Orientation  Overall Orientation Status: Within Functional Limits  Cognition  Overall Cognitive Status: WFL     Objective      Observation/Palpation  Posture: Good                       Bed mobility  Supine to Sit: Independent  Sit to Supine: Independent  Scooting: Independent  Transfers  Sit to

## 2023-03-24 NOTE — PROGRESS NOTES
03/24/2023    HCT 28.4 (L) 03/24/2023    MCV 83.3 03/24/2023     03/24/2023     Lab Results   Component Value Date    CALCIUM 7.4 (L) 03/24/2023

## 2023-03-24 NOTE — PROGRESS NOTES
Hospitalist Progress Note      PCP: [facility]    Chief Complaint. Patient is a 39-year-old male who presents to the hospital due to diarrhea. According to patient he had 2 bowel movements, those were watery. Patient also has abdominal discomfort, he has not been able to hold down food, he mentions it happened suddenly, he feels weak. He denies chest pain shortness of breath fevers chills. Date of Admission: 3/22/2023    Subjective: denies chest pain, nausea, vomiting, shortness of breath, fever or chills. mention feels overall better    Medications:  Reviewed    Infusion Medications    lactated ringers IV soln 125 mL/hr at 03/23/23 1458    sodium chloride       Scheduled Medications    sodium chloride flush  10 mL IntraVENous 2 times per day    enoxaparin  40 mg SubCUTAneous Daily    cefepime  2,000 mg IntraVENous Q12H    vancomycin (VANCOCIN) intermittent dosing (placeholder)   Other RX Placeholder    metroNIDAZOLE  500 mg IntraVENous Q8H     PRN Meds: loperamide, sodium chloride flush, sodium chloride, potassium chloride **OR** potassium alternative oral replacement **OR** potassium chloride, magnesium sulfate, promethazine **OR** ondansetron, magnesium hydroxide, acetaminophen **OR** acetaminophen      Intake/Output Summary (Last 24 hours) at 3/23/2023 2002  Last data filed at 3/23/2023 1704  Gross per 24 hour   Intake 400 ml   Output 750 ml   Net -350 ml       Physical Exam Performed:    BP (!) 143/74   Pulse 93   Temp 99 °F (37.2 °C) (Oral)   Resp 23   Ht 5' 8\" (1.727 m)   Wt 203 lb 4.2 oz (92.2 kg)   SpO2 95%   BMI 30.91 kg/m²     General appearance: No apparent distress,   HEENT:  Conjunctivae/corneas clear. Neck: Supple, with full range of motion. Respiratory:  Normal respiratory effort. Clear to auscultation, bilaterally without Rales/Wheezes/Rhonchi.   Cardiovascular: Regular rate and rhythm with normal S1/S2 without murmurs or rubs  Abdomen: Soft, non-tender, Patient also has abdominal discomfort, he has not been able to hold down food, he mentions it happened suddenly, he feels weak. He denies chest pain shortness of breath fevers chills. Assessment  Dehydration  Diarrhea, rule out C. difficile  Lactic acidosis  Elevated CK, rhabdomyolysis  Leukocytosis, tachycardia, lactic acidosis suspect severe sepsis, unclear etiology  Elevated procalcitonin  Acute kidney injury  Mild pericardial effusion, mild retroperitoneal lymphadenopathy     Plan  Start IV fluids, Cr improving  Continue antibiotics with IV vancomycin, cefepime, blood Cx positive for Strep, repeat cultures  Check stool C. Difficile - negatiave  urine culture - ngtd  Monitor CK level - improving  Monitor creatinine  Monitor replace electrolytes  DVT prophylaxis-on heparin  Diet: ADULT DIET;  Regular  Code Status: Full Code    PT/OT Eval Status: ordered    Dispo/Plan of care - continue to monitor CK level and Cr    Ivana Cade MD

## 2023-03-24 NOTE — PROGRESS NOTES
CLINICAL PHARMACY NOTE: MEDS TO BEDS    Discharge medications are ready in inpatient pharmacy for weekend delivery.     03/24/23 3:50 PM       Hydrocortisone 1% cream not in stock, pharmacist transferred verbally to marla matute at [facility] [phone]

## 2023-03-25 VITALS
BODY MASS INDEX: 31.14 KG/M2 | WEIGHT: 205.47 LBS | TEMPERATURE: 97.5 F | HEART RATE: 65 BPM | SYSTOLIC BLOOD PRESSURE: 121 MMHG | HEIGHT: 68 IN | RESPIRATION RATE: 20 BRPM | OXYGEN SATURATION: 96 % | DIASTOLIC BLOOD PRESSURE: 83 MMHG

## 2023-03-25 LAB
ANION GAP SERPL CALCULATED.3IONS-SCNC: 11 MMOL/L (ref 3–16)
ANISOCYTOSIS BLD QL SMEAR: ABNORMAL
BACTERIA BLD CULT: ABNORMAL
BACTERIA BLD CULT: ABNORMAL
BASOPHILS # BLD: 0 K/UL (ref 0–0.2)
BASOPHILS NFR BLD: 0 %
BUN SERPL-MCNC: 23 MG/DL (ref 7–20)
CALCIUM SERPL-MCNC: 7.7 MG/DL (ref 8.3–10.6)
CHLORIDE SERPL-SCNC: 108 MMOL/L (ref 99–110)
CK SERPL-CCNC: 454 U/L (ref 39–308)
CO2 SERPL-SCNC: 20 MMOL/L (ref 21–32)
CREAT SERPL-MCNC: 0.8 MG/DL (ref 0.9–1.3)
CREAT UR-MCNC: 122 MG/DL (ref 39–259)
CREAT UR-MCNC: 125.3 MG/DL (ref 39–259)
DEPRECATED RDW RBC AUTO: 14.2 % (ref 12.4–15.4)
DOHLE BOD BLD QL SMEAR: PRESENT
EOSINOPHIL # BLD: 0.3 K/UL (ref 0–0.6)
EOSINOPHIL NFR BLD: 2 %
GFR SERPLBLD CREATININE-BSD FMLA CKD-EPI: >60 ML/MIN/{1.73_M2}
GLUCOSE SERPL-MCNC: 103 MG/DL (ref 70–99)
HCT VFR BLD AUTO: 33.1 % (ref 40.5–52.5)
HGB BLD-MCNC: 10.8 G/DL (ref 13.5–17.5)
LYMPHOCYTES # BLD: 0.9 K/UL (ref 1–5.1)
LYMPHOCYTES NFR BLD: 6 %
MCH RBC QN AUTO: 27.1 PG (ref 26–34)
MCHC RBC AUTO-ENTMCNC: 32.5 G/DL (ref 31–36)
MCV RBC AUTO: 83.5 FL (ref 80–100)
MICROALBUMIN UR DL<=1MG/L-MCNC: 3.4 MG/DL
MICROALBUMIN/CREAT UR: 27.1 MG/G (ref 0–30)
MONOCYTES # BLD: 0.4 K/UL (ref 0–1.3)
MONOCYTES NFR BLD: 3 %
NEUTROPHILS # BLD: 12.8 K/UL (ref 1.7–7.7)
NEUTROPHILS NFR BLD: 78 %
NEUTS BAND NFR BLD MANUAL: 11 % (ref 0–7)
NEUTS VAC BLD QL SMEAR: PRESENT
ORGANISM: ABNORMAL
ORGANISM: ABNORMAL
PLATELET # BLD AUTO: 198 K/UL (ref 135–450)
PLATELET BLD QL SMEAR: ADEQUATE
PMV BLD AUTO: 8.4 FL (ref 5–10.5)
POTASSIUM SERPL-SCNC: 4.2 MMOL/L (ref 3.5–5.1)
PROT UR-MCNC: 32 MG/DL
PROT/CREAT UR-RTO: 0.3 MG/DL
RBC # BLD AUTO: 3.97 M/UL (ref 4.2–5.9)
SLIDE REVIEW: ABNORMAL
SMUDGE CELLS BLD QL SMEAR: PRESENT
SODIUM SERPL-SCNC: 139 MMOL/L (ref 136–145)
TOXIC GRANULES BLD QL SMEAR: PRESENT
VANCOMYCIN SERPL-MCNC: 18.3 UG/ML
WBC # BLD AUTO: 14.4 K/UL (ref 4–11)

## 2023-03-25 PROCEDURE — 6360000002 HC RX W HCPCS: Performed by: INTERNAL MEDICINE

## 2023-03-25 PROCEDURE — 80202 ASSAY OF VANCOMYCIN: CPT

## 2023-03-25 PROCEDURE — 85025 COMPLETE CBC W/AUTO DIFF WBC: CPT

## 2023-03-25 PROCEDURE — 36415 COLL VENOUS BLD VENIPUNCTURE: CPT

## 2023-03-25 PROCEDURE — 94760 N-INVAS EAR/PLS OXIMETRY 1: CPT

## 2023-03-25 PROCEDURE — 80048 BASIC METABOLIC PNL TOTAL CA: CPT

## 2023-03-25 PROCEDURE — 82570 ASSAY OF URINE CREATININE: CPT

## 2023-03-25 PROCEDURE — 82043 UR ALBUMIN QUANTITATIVE: CPT

## 2023-03-25 PROCEDURE — 82550 ASSAY OF CK (CPK): CPT

## 2023-03-25 PROCEDURE — 84156 ASSAY OF PROTEIN URINE: CPT

## 2023-03-25 PROCEDURE — 2500000003 HC RX 250 WO HCPCS: Performed by: INTERNAL MEDICINE

## 2023-03-25 PROCEDURE — 2580000003 HC RX 258: Performed by: INTERNAL MEDICINE

## 2023-03-25 RX ADMIN — METRONIDAZOLE 500 MG: 500 INJECTION, SOLUTION INTRAVENOUS at 05:10

## 2023-03-25 RX ADMIN — HYDROCORTISONE: 0.01 CREAM TOPICAL at 09:30

## 2023-03-25 RX ADMIN — CEFEPIME 2000 MG: 2 INJECTION, POWDER, FOR SOLUTION INTRAVENOUS at 01:06

## 2023-03-25 RX ADMIN — CEFEPIME 2000 MG: 2 INJECTION, POWDER, FOR SOLUTION INTRAVENOUS at 09:33

## 2023-03-25 RX ADMIN — VANCOMYCIN HYDROCHLORIDE 1500 MG: 1.5 INJECTION, POWDER, LYOPHILIZED, FOR SOLUTION INTRAVENOUS at 10:19

## 2023-03-25 RX ADMIN — ENOXAPARIN SODIUM 40 MG: 100 INJECTION SUBCUTANEOUS at 09:30

## 2023-03-25 NOTE — PROGRESS NOTES
Hospitalist Progress Note      PCP: [facility]    Chief Complaint. Patient is a 25-year-old male who presents to the hospital due to diarrhea. According to patient he had 2 bowel movements, those were watery. Patient also has abdominal discomfort, he has not been able to hold down food, he mentions it happened suddenly, he feels weak. He denies chest pain shortness of breath fevers chills. Date of Admission: 3/22/2023    Subjective:  denies chest pain, nausea, vomiting, shortness of breath, fever or chills. mention feels overall better, no complains todaay    Medications:  Reviewed    Infusion Medications    sodium chloride       Scheduled Medications    vancomycin  1,500 mg IntraVENous Q12H    hydrocortisone   Topical BID    sodium chloride flush  10 mL IntraVENous 2 times per day    enoxaparin  40 mg SubCUTAneous Daily    cefepime  2,000 mg IntraVENous Q12H    vancomycin (VANCOCIN) intermittent dosing (placeholder)   Other RX Placeholder    metroNIDAZOLE  500 mg IntraVENous Q8H     PRN Meds: loperamide, sodium chloride flush, sodium chloride, potassium chloride **OR** potassium alternative oral replacement **OR** potassium chloride, magnesium sulfate, promethazine **OR** ondansetron, magnesium hydroxide, acetaminophen **OR** acetaminophen      Intake/Output Summary (Last 24 hours) at 3/24/2023 2040  Last data filed at 3/24/2023 1246  Gross per 24 hour   Intake 120 ml   Output 650 ml   Net -530 ml         Physical Exam Performed:    /80   Pulse 67   Temp 98.4 °F (36.9 °C) (Oral)   Resp 18   Ht 5' 8\" (1.727 m)   Wt 205 lb 7.5 oz (93.2 kg)   SpO2 98%   BMI 31.24 kg/m²     General appearance: No apparent distress, on RA  HEENT:  Conjunctivae/corneas clear. Neck: Supple, with full range of motion. Respiratory:  Normal respiratory effort. Clear to auscultation, bilaterally without Rales/Wheezes/Rhonchi.   Cardiovascular: Regular rate and rhythm with normal S1/S2 without murmurs or Patient is a 79-year-old male who presents to the hospital due to diarrhea. According to patient he had 2 bowel movements, those were watery. Patient also has abdominal discomfort, he has not been able to hold down food, he mentions it happened suddenly, he feels weak. He denies chest pain shortness of breath fevers chills. Assessment  Dehydration  Diarrhea, rule out C. difficile  Lactic acidosis  Elevated CK, rhabdomyolysis  Leukocytosis, tachycardia, lactic acidosis suspect severe sepsis, unclear etiology  Elevated procalcitonin  Acute kidney injury  Mild pericardial effusion, mild retroperitoneal lymphadenopathy     Plan  Start IV fluids, Cr improving  Continue antibiotics with IV vancomycin, cefepime, blood Cx positive for Strep pyogenes, order Echo to r/o Endocarditis, repeat cultures - ngtd  Check stool C. Difficile - negatiave  urine culture - ngtd  Monitor CK level - improving  Monitor creatinine  Monitor replace electrolytes  DVT prophylaxis-on heparin  Diet: ADULT DIET;  Regular  Code Status: Full Code    PT/OT Eval Status: ordered    Dispo/Plan of care - continue to monitor CK level and Cr, await TTE     Malinda Mosquera MD

## 2023-03-25 NOTE — PLAN OF CARE
Problem: Safety - Adult  Goal: Free from fall injury  3/25/2023 0411 by Wai Teresa RN  Outcome: Progressing  Flowsheets (Taken 3/24/2023 0139 by Kathie Pelletier RN)  Free From Fall Injury: Instruct family/caregiver on patient safety  3/25/2023 0410 by Wai Teresa RN  Outcome: Progressing     Problem: Infection - Adult  Goal: Absence of infection at discharge  3/25/2023 0411 by Wai Teresa RN  Outcome: Progressing  Flowsheets (Taken 3/25/2023 0411)  Absence of infection at discharge:   Assess and monitor for signs and symptoms of infection   Monitor lab/diagnostic results   Administer medications as ordered   Instruct and encourage patient and family to use good hand hygiene technique  3/25/2023 0410 by Wai Teresa RN  Outcome: Progressing     Problem: Infection - Adult  Goal: Absence of infection during hospitalization  3/25/2023 0411 by Wai Teresa RN  Outcome: Progressing  Flowsheets (Taken 3/25/2023 0411)  Absence of infection during hospitalization:   Assess and monitor for signs and symptoms of infection   Monitor lab/diagnostic results   Administer medications as ordered  3/25/2023 0410 by Wai Teresa RN  Outcome: Progressing

## 2023-03-25 NOTE — PROGRESS NOTES
IV and telemetry monitor removed. Discharge paperwork completed and discussed with the patient and wife. All questions answered. Patient has been made aware of follow up appointments that have been made/need to be made and patient verbalized understanding. Patient has been given all paper prescriptions that need to be filled or the medications have been filled with [facility] and patient participated in [address] to [facility]. All belongings have been packed up and sent with the patient. Patient will be driven home by wife.

## 2023-03-25 NOTE — PROGRESS NOTES
weight change, fatigue,      Skin:  rash, pruritus, hair loss, bruising, dry skin, petechiae. Head, Face, Neck   headaches, swelling,  cervical adenopathy. Respiratory: shortness of breath, cough, or wheezing  Cardiovascular: chest pain, palpitations, dizzy, edema  Gastrointestinal: nausea, vomiting, diarrhea, constipation,belly pain    Yellow skin, blood in stool  Musculoskeletal:  back pain, muscle weakness, gait problems,       joint pain or swelling. Genitourinary:  dysuria, poor urine flow, flank pain, blood in urine  Neurologic:  vertigo, TIA'S, syncope, seizures, focal weakness  Psychosocial:  insomnia, anxiety, or depression. Additional positive findings: -     PMH:   Past medical history, surgical history, social history, family history are reviewed and updated as appropriate. Reviewed current medication list.   Allergies reviewed and updated as needed. PE:   Vitals:    03/25/23 0810   BP: 132/83   Pulse: 63   Resp: 16   Temp: 98 °F (36.7 °C)   SpO2: 96%       General appearance:  in NAD, fully alert and oriented. Comfortable. HEENT: EOM intact, no icterus. Trachea is midline. Neck : No masses, appears symmetrical  Respiratory: Respiratory effort appears normal, bilateral equal chest rise  Cardiovascular: Ausculation shows RRR no edema  Abdomen  Musculoskeletal:  Joints with no swelling or deformity. Skin:no rashes, ulcers, induration, no jaundice. Neuro: face symmetric, no focal deficits. Appropriate responses.        Lab Results   Component Value Date    CREATININE 0.8 (L) 03/25/2023    BUN 23 (H) 03/25/2023     03/25/2023    K 4.2 03/25/2023     03/25/2023    CO2 20 (L) 03/25/2023      Lab Results   Component Value Date    WBC 14.4 (H) 03/25/2023    HGB 10.8 (L) 03/25/2023    HCT 33.1 (L) 03/25/2023    MCV 83.5 03/25/2023     03/25/2023     Lab Results   Component Value Date    CALCIUM 7.7 (L) 03/25/2023

## 2023-03-26 LAB — BACTERIA BLD CULT ORG #2: NORMAL

## 2023-03-27 LAB
BACTERIA BLD CULT ORG #2: NORMAL
BACTERIA BLD CULT: NORMAL